# Patient Record
Sex: FEMALE | Race: WHITE | URBAN - METROPOLITAN AREA
[De-identification: names, ages, dates, MRNs, and addresses within clinical notes are randomized per-mention and may not be internally consistent; named-entity substitution may affect disease eponyms.]

---

## 2017-11-18 ENCOUNTER — OFFICE VISIT (OUTPATIENT)
Dept: NEUROLOGY | Facility: CLINIC | Age: 42
End: 2017-11-18

## 2017-11-18 VITALS
BODY MASS INDEX: 46.52 KG/M2 | HEART RATE: 86 BPM | HEIGHT: 64 IN | SYSTOLIC BLOOD PRESSURE: 141 MMHG | DIASTOLIC BLOOD PRESSURE: 85 MMHG | OXYGEN SATURATION: 96 % | RESPIRATION RATE: 20 BRPM | WEIGHT: 272.5 LBS

## 2017-11-18 DIAGNOSIS — R41.3 MEMORY LOSS: ICD-10-CM

## 2017-11-18 DIAGNOSIS — R26.9 ABNORMALITY OF GAIT: ICD-10-CM

## 2017-11-18 DIAGNOSIS — R26.9 ABNORMALITY OF GAIT: Primary | ICD-10-CM

## 2017-11-18 DIAGNOSIS — G93.40 ENCEPHALOPATHY: ICD-10-CM

## 2017-11-18 DIAGNOSIS — D69.6 PLATELETS DECREASED (H): ICD-10-CM

## 2017-11-18 LAB
BASOPHILS # BLD AUTO: 0 10E9/L (ref 0–0.2)
BASOPHILS NFR BLD AUTO: 0.4 %
DIFFERENTIAL METHOD BLD: NORMAL
EOSINOPHIL # BLD AUTO: 0.2 10E9/L (ref 0–0.7)
EOSINOPHIL NFR BLD AUTO: 2.2 %
ERYTHROCYTE [DISTWIDTH] IN BLOOD BY AUTOMATED COUNT: 12.8 % (ref 10–15)
HCT VFR BLD AUTO: 42.5 % (ref 35–47)
HGB BLD-MCNC: 13.6 G/DL (ref 11.7–15.7)
IMM GRANULOCYTES # BLD: 0 10E9/L (ref 0–0.4)
IMM GRANULOCYTES NFR BLD: 0.3 %
LYMPHOCYTES # BLD AUTO: 1.7 10E9/L (ref 0.8–5.3)
LYMPHOCYTES NFR BLD AUTO: 24.6 %
MCH RBC QN AUTO: 29.6 PG (ref 26.5–33)
MCHC RBC AUTO-ENTMCNC: 32 G/DL (ref 31.5–36.5)
MCV RBC AUTO: 92 FL (ref 78–100)
MONOCYTES # BLD AUTO: 0.5 10E9/L (ref 0–1.3)
MONOCYTES NFR BLD AUTO: 7.5 %
NEUTROPHILS # BLD AUTO: 4.4 10E9/L (ref 1.6–8.3)
NEUTROPHILS NFR BLD AUTO: 65 %
NRBC # BLD AUTO: 0 10*3/UL
NRBC BLD AUTO-RTO: 0 /100
PLATELET # BLD AUTO: 171 10E9/L (ref 150–450)
RBC # BLD AUTO: 4.6 10E12/L (ref 3.8–5.2)
WBC # BLD AUTO: 6.8 10E9/L (ref 4–11)

## 2017-11-18 ASSESSMENT — ENCOUNTER SYMPTOMS
FEVER: 0
INCREASED ENERGY: 1
FATIGUE: 1
WEAKNESS: 0
ARTHRALGIAS: 1
DIZZINESS: 1
NECK PAIN: 0
POOR WOUND HEALING: 0
CHILLS: 0
WEIGHT LOSS: 0
INSOMNIA: 1
HALLUCINATIONS: 0
PANIC: 0
WEIGHT GAIN: 0
NIGHT SWEATS: 1
MEMORY LOSS: 1
MUSCLE CRAMPS: 0
SEIZURES: 0
NERVOUS/ANXIOUS: 0
PARALYSIS: 0
DEPRESSION: 0
DECREASED APPETITE: 0
NUMBNESS: 1
STIFFNESS: 1
DECREASED CONCENTRATION: 1
POLYPHAGIA: 0
JOINT SWELLING: 0
DISTURBANCES IN COORDINATION: 1
NAIL CHANGES: 0
TINGLING: 0
SPEECH CHANGE: 0
BACK PAIN: 0
DECREASED LIBIDO: 0
MYALGIAS: 1
HOT FLASHES: 1
SKIN CHANGES: 0
POLYDIPSIA: 1
TREMORS: 0
ALTERED TEMPERATURE REGULATION: 1
LOSS OF CONSCIOUSNESS: 0
HEADACHES: 0
MUSCLE WEAKNESS: 0

## 2017-11-18 ASSESSMENT — PAIN SCALES - GENERAL: PAINLEVEL: SEVERE PAIN (7)

## 2017-11-18 NOTE — MR AVS SNAPSHOT
After Visit Summary   11/18/2017    Vero Almodovar    MRN: [id number]           Patient Information     Date Of Birth          1975        Visit Information        Provider Department      11/18/2017 11:40 AM Rosalio Lares MD [facility]        Today's Diagnoses     Abnormality of gait    -  1    Encephalopathy        Platelets decreased (H)        Memory loss           Follow-ups after your visit        Additional Services     Psychometric Testing (external facility)       Please be aware that coverage of these services is subject to the terms and limitations of your health insurance plan.  Call member services at your health plan with any benefit or coverage questions.  G[facility]is Hickey [facility] ;compare to earlier    Please bring the following to your appointment:  Any x-rays, CTs or MRIs which have been performed.  Contact the facility where they were done to arrange for  prior to your scheduled appointment.    List of current medications   This referral request   Any documents/labs given to you for this referral                  Follow-up notes from your care team     Return in about 2 months (around 1/26/2018).      Your next 10 appointments already scheduled     Nov 18, 2017  1:00 PM CST   LAB with [facility] ([facility])    [address] [place] [id number] [phone]           Please do not eat 10-12 hours before your appointment if you are coming in fasting for labs on lipids, cholesterol, or glucose (sugar). This does not apply to pregnant women. Water, hot tea and black coffee (with nothing added) are okay. Do not drink other fluids, diet soda or chew gum.            Feb 16, 2018 10:00 AM CST   (Arrive by 9:45 AM)   Return Visit with Vinh Bradshaw MD   [facility] ([facility])    [address] [place] [id number]  "  [phone]              Future tests that were ordered for you today     Open Future Orders        Priority Expected Expires Ordered    CBC with platelets differential Routine  11/18/2018 11/18/2017    Vitamin B1 whole blood Routine  11/18/2018 11/18/2017            Who to contact     Please call your clinic at [phone] to:    Ask questions about your health    Make or cancel appointments    Discuss your medicines    Learn about your test results    Speak to your doctor   If you have compliments or concerns about an experience at your clinic, or if you wish to file a complaint, please contact [facility] Patient Relations at [phone] or email us at [email or web address]         Additional Information About Your Visit        [employer] Information     [employer] gives you secure access to your electronic health record. If you see a primary care provider, you can also send messages to your care team and make appointments. If you have questions, please call your primary care clinic.  If you do not have a primary care provider, please call [phone] and they will assist you.      [employer] is an electronic gateway that provides easy, online access to your medical records. With [employer], you can request a clinic appointment, read your test results, renew a prescription or communicate with your care team.     To access your existing account, please contact your [facility] or call [phone] for assistance.        Care EveryWhere ID     This is your Care EveryWhere ID. This could be used by other organizations to access your [facility] medical records  [id number]        Your Vitals Were     Pulse Respirations Height Pulse Oximetry BMI (Body Mass Index)       86 20 1.626 m (5' 4\") 96% 46.77 kg/m2        Blood Pressure from Last 3 Encounters:   11/18/17 141/85   02/08/16 138/80   08/31/15 122/80    Weight from Last 3 Encounters:   11/18/17 123.6 kg " (272 lb 8 oz)              We Performed the Following     Psychometric Testing (external facility)        Primary Care Provider Office Phone # Fax #    Deepali Osei PA-C [phone]       [place] [address]        Equal Access to Services     BERTIN BLUM : Hadii aad ku hadedenilsono Soomaali, waaxda luqadaha, qaybta kaalmada adeegyada, waxay idiin hayenricon adejovanna cooper la'enricokathy smith. So [facility] [phone].    ATENCIÓN: Si habla español, tiene a gamboa disposición servicios gratuitos de asistencia lingüística. Llame al [phone].    We comply with applicable federal civil rights laws and [place] laws. We do not discriminate on the basis of race, color, national origin, age, disability, sex, sexual orientation, or gender identity.            Thank you!     Thank you for choosing [facility]  for your care. Our goal is always to provide you with excellent care. Hearing back from our patients is one way we can continue to improve our services. Please take a few minutes to complete the written survey that you may receive in the mail after your visit with us. Thank you!             Your Updated Medication List - Protect others around you: Learn how to safely use, store and throw away your medicines at www.disposemymeds.org.          This list is accurate as of: 11/18/17 12:41 PM.  Always use your most recent med list.                   Brand Name Dispense Instructions for use Diagnosis    ALLEGRA PO      Take 180 mg by mouth daily        COLACE PO      Take 100 mg by mouth 2 times daily        FOLIC ACID PO      Take 1 mg by mouth daily        IMODIUM OR      Take by mouth as needed        MOTRIN PO      Take by mouth as needed        MULTIVITAMIN PO      Take by mouth daily        TYLENOL PO      Take 650 mg by mouth as needed for mild pain or fever        VITAMIN B-1 PO      Take 100 mg by mouth daily        ZOFRAN 4 MG tablet   Generic drug:  ondansetron      Take by  mouth every 8 hours as needed for nausea

## 2017-11-18 NOTE — LETTER
2017      Deepali Osei PA-C   [facility]    [address] [place] [address]      RE: Vero Almodovar   MRN: [id number]   : 1975      Dear Dr. Osei:      Your patient, Vero Almodovar, requested neurologic followup today on 2017.  Her chief complaint is that of imbalance, memory loss, and a desire that she go on disability.      The patient has now contacted a .  She has seen Dr. Bradshaw, my partner, a number of times.  I do have his notes and I reviewed those with the patient and also with her mother, Phyllis.  The patient is now 42 years old.  She developed what sounds like subacute or acute imbalance and memory disturbance when she was 40.  She was seen at [facility] when she was brought by ambulance.  It was felt it was perhaps a combination of alcohol intoxication and nutritional deficiencies causing her problem.  She had a low thiamine level since at least 2014.  She did have a normal B12 level then.  The patient improved when she was admitted to [facility].  Her alcohol level when she was admitted was 0.  She did tell me that she had used alcohol, but very sparingly and she did not feel that she was using alcohol at that time.  She said her problem had to do with poor food intake.  She was working at [employer] and she said her hours were such that it was hard for her to eat meals in any kind of regular manner.  The patient did tell me that her balance has improved since that time, but it has never fully recovered.  She said when she gets up she has to hold on to objects.  She has not suffered any falls.  She does note she leans.  This comes when she is upright and walking.  She also notes that her balance is not as good if she attempts to bend over.  The patient was forthright with me.  She has not been taking her thiamin nor her potassium as well as folic acid lately as she has lapsed done refills of her prescriptions.  She did tell  me though she is taking a multivitamin.  She said she would like to work, but she is between employment now because she does not have a permanent residence.  She has been living between her sister's and mother's homes.  The patient is single and she thought she was menopausal.  She had a number of tests done on 10/05/2017.  The patient at that time did have a normal LH, testosterone, estradiol, and prolactin level as well as metabolic profile.  Her TSH was 1.31.  Since that time, though, she has started to bleed and she said she had heavy spotting and clots.  This may have improved just in the last day or 2.  The patient did review with me her other labs and on 09/21/2017 she had normal liver function tests.  She had negative serology on 08/02/2015.  She had on 12/29/2016 a hemoglobin of 16.3 which would be elevated and her platelets were 109,000.  She had had earlier a platelet count just a short time before that which was also low.  I could not find that she has had a followup level.  She has not had any skin bruising but did have this heavy period after a number of months of not having periods.  She is celibate.  The patient said she now has a good diet.  She has not had any headache, visual disturbance, focal weakness, paresthesias nor incontinence.  She denied true vertigo.  She did not do well in school and was in special ed and her mother said she had ADHD.  She was able to graduate.  The patient did have psychometric testing that Dr. Bradshaw reviewed in 02/08/2016 and I believe this was probably done through [facility] with Dr. Génesis Ndiaye.  The patient seems to remember that name and going to the [facility] to have the test.  She performed in a borderline impaired range of overall intellectual ability.  This was consistent with her background and educational history.  The patient's records do indicate that this did not appear to be due to inattention, but she did show deficits in learning and  retention of information.  The patient also did have an MRI scan of her head then that showed atrophy.  The patient did tell me she is able to shop and manage her own bills.      Neurologic examination revealed a pleasant woman.  She was able to draw a clock correctly.  She said she could not recall 3/3 objects that she was able to register.  She could tell me 2 states that touch [place] but not [place].  She knew Hardik was the president and she knew he had recently been in [place].  Otherwise, she actually walked quite well for me.  She was able to do tandem with concentration.  She had negative Romberg and negative pull test.  She was areflexic.  She had normal cortical sensory testing and normal light touch.  The patient denied any sensory disturbance.  She had no dysmetria.  Cranial nerve examination was unremarkable with good extraocular movements.  She had full visual fields to confrontation.  The patient had fluent speech.  She did not have frontal lobe release signs.  She did not have cervical bruits nor did she have any abnormality of cardiac auscultation, including gallops or murmurs.      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      The patient feels that she really has not recovered fully, but she is now between residents and is going to look for work when she establishes herself.  I have encouraged her that she fill her prescriptions for the nutrition supplements that have been recommended.  She did tell me she does not use alcohol to any extent and has not used any illicit drugs.  She did have low platelet count documented on 12/29/2016 and elevated hemoglobin.  This has not been repeated that I could find through the [employer] website.  I am going to have this done today along with a thiamine level.  I have asked that she have followup psychometrics with Dr. Génesis Ndiaye at [facility] and then have followup with Dr. Bradshaw, her regular  neurologist, in December or January.      Thank you for allowing me to see this patient.      Sincerely yours,      Rosalio Lares MD       I spent 40 minutes with the patient today.  Over 50% of the time this involved counseling and coordination of care.      D: 2017 14:09   T: 2017 17:55   MT: ASHISH    Name:     THOR CARRANZA   MRN:      [id number]        Account:      [id number]   :      1975           Service Date: 2017    Document: [id number]

## 2017-11-19 NOTE — PROGRESS NOTES
2017      Deepali Osei PA-C   [facility]    [address] [place] [address]      RE: Vero Almodovar   MRN: [id number]   : 1975      Dear Dr. Osei:      Your patient, Vero Almodovar, requested neurologic followup today on 2017.  Her chief complaint is that of imbalance, memory loss, and a desire that she go on disability.      The patient has now contacted a .  She has seen Dr. Bradshaw, my partner, a number of times.  I do have his notes and I reviewed those with the patient and also with her mother, Phyllis.  The patient is now 42 years old.  She developed what sounds like subacute or acute imbalance and memory disturbance when she was 40.  She was seen at [facility] when she was brought by ambulance.  It was felt it was perhaps a combination of alcohol intoxication and nutritional deficiencies causing her problem.  She had a low thiamine level since at least 2014.  She did have a normal B12 level then.  The patient improved when she was admitted to [facility].  Her alcohol level when she was admitted was 0.  She did tell me that she had used alcohol, but very sparingly and she did not feel that she was using alcohol at that time.  She said her problem had to do with poor food intake.  She was working at [employer] and she said her hours were such that it was hard for her to eat meals in any kind of regular manner.  The patient did tell me that her balance has improved since that time, but it has never fully recovered.  She said when she gets up she has to hold on to objects.  She has not suffered any falls.  She does note she leans.  This comes when she is upright and walking.  She also notes that her balance is not as good if she attempts to bend over.  The patient was forthright with me.  She has not been taking her thiamin nor her potassium as well as folic acid lately as she has lapsed done refills of her prescriptions.  She did tell  me though she is taking a multivitamin.  She said she would like to work, but she is between employment now because she does not have a permanent residence.  She has been living between her sister's and mother's homes.  The patient is single and she thought she was menopausal.  She had a number of tests done on 10/05/2017.  The patient at that time did have a normal LH, testosterone, estradiol, and prolactin level as well as metabolic profile.  Her TSH was 1.31.  Since that time, though, she has started to bleed and she said she had heavy spotting and clots.  This may have improved just in the last day or 2.  The patient did review with me her other labs and on 09/21/2017 she had normal liver function tests.  She had negative serology on 08/02/2015.  She had on 12/29/2016 a hemoglobin of 16.3 which would be elevated and her platelets were 109,000.  She had had earlier a platelet count just a short time before that which was also low.  I could not find that she has had a followup level.  She has not had any skin bruising but did have this heavy period after a number of months of not having periods.  She is celibate.  The patient said she now has a good diet.  She has not had any headache, visual disturbance, focal weakness, paresthesias nor incontinence.  She denied true vertigo.  She did not do well in school and was in special ed and her mother said she had ADHD.  She was able to graduate.  The patient did have psychometric testing that Dr. Bradshaw reviewed in 02/08/2016 and I believe this was probably done through [facility] with Dr. Génesis Ndiaye.  The patient seems to remember that name and going to the [facility] to have the test.  She performed in a borderline impaired range of overall intellectual ability.  This was consistent with her background and educational history.  The patient's records do indicate that this did not appear to be due to inattention, but she did show deficits in learning and  retention of information.  The patient also did have an MRI scan of her head then that showed atrophy.  The patient did tell me she is able to shop and manage her own bills.      Neurologic examination revealed a pleasant woman.  She was able to draw a clock correctly.  She said she could not recall 3/3 objects that she was able to register.  She could tell me 2 states that touch [place] but not [place].  She knew Hardik was the president and she knew he had recently been in [place].  Otherwise, she actually walked quite well for me.  She was able to do tandem with concentration.  She had negative Romberg and negative pull test.  She was areflexic.  She had normal cortical sensory testing and normal light touch.  The patient denied any sensory disturbance.  She had no dysmetria.  Cranial nerve examination was unremarkable with good extraocular movements.  She had full visual fields to confrontation.  The patient had fluent speech.  She did not have frontal lobe release signs.  She did not have cervical bruits nor did she have any abnormality of cardiac auscultation, including gallops or murmurs.      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      The patient feels that she really has not recovered fully, but she is now between residents and is going to look for work when she establishes herself.  I have encouraged her that she fill her prescriptions for the nutrition supplements that have been recommended.  She did tell me she does not use alcohol to any extent and has not used any illicit drugs.  She did have low platelet count documented on 12/29/2016 and elevated hemoglobin.  This has not been repeated that I could find through the [employer] website.  I am going to have this done today along with a thiamine level.  I have asked that she have followup psychometrics with Dr. Génesis Ndiaye at [facility] and then have followup with Dr. Bradshaw, her regular  neurologist, in December or January.      Thank you for allowing me to see this patient.      Sincerely yours,      Neelam Lares MD       I spent 40 minutes with the patient today.  Over 50% of the time this involved counseling and coordination of care.         NEELAM LARES MD             D: 2017 14:09   T: 2017 17:55   MT: ASHISH      Name:     THOR CARRANZA   MRN:      [id number]        Account:      [id number]   :      1975           Service Date: 2017      Document: [id number]

## 2017-11-23 LAB — VIT B1 BLD-MCNC: 80 NMOL/L (ref 70–180)

## 2017-11-29 ENCOUNTER — TELEPHONE (OUTPATIENT)
Dept: NEUROLOGY | Facility: CLINIC | Age: 42
End: 2017-11-29

## 2017-11-29 NOTE — TELEPHONE ENCOUNTER
----- Message from Rosalio Lares MD sent at 11/29/2017  9:46 AM CST -----  Tell her blood tests are normal

## 2017-12-31 ENCOUNTER — HEALTH MAINTENANCE LETTER (OUTPATIENT)
Age: 42
End: 2017-12-31

## 2018-01-18 ENCOUNTER — CARE COORDINATION (OUTPATIENT)
Dept: NEUROLOGY | Facility: CLINIC | Age: 43
End: 2018-01-18

## 2018-01-18 NOTE — PROGRESS NOTES
----- Message -----      From: Becca Blair      Sent: 1/18/2018  10:20 AM        To: Ivan Vogel, RN, Ainsley Lockwood RN, *   Subject: Neuro-psych referral update and appointment needed       Per call from patient and her  Aikko in re: to the referral for (Neuropsych) from Dr. Lares.  They would like to request this referral to be done internally instead and be seen by one of our Neuropsych providers.  Please call patient's mom Phyllis Almodovar at [phone] to arrange scheduling as this evaluation needs to be done ASAP for her disability.  The patient's hearing is tomorrow so if they can at least show that the appointment is scheduled, then they can supplement the results later if needed.     Thank You,   Becca        1/18/18: message sent to Raquel Flood in neuropsych to contact patient to schedule. Left voicemail message for patient's mom, Phyllis, letting her know this. Left my contact info incase she has any questions/concerns.

## 2018-02-16 ENCOUNTER — OFFICE VISIT (OUTPATIENT)
Dept: NEUROLOGY | Facility: CLINIC | Age: 43
End: 2018-02-16
Payer: COMMERCIAL

## 2018-02-16 VITALS
HEART RATE: 64 BPM | WEIGHT: 263.3 LBS | BODY MASS INDEX: 44.95 KG/M2 | DIASTOLIC BLOOD PRESSURE: 83 MMHG | SYSTOLIC BLOOD PRESSURE: 132 MMHG | HEIGHT: 64 IN

## 2018-02-16 DIAGNOSIS — R41.3 MEMORY LOSS: Primary | ICD-10-CM

## 2018-02-16 ASSESSMENT — PAIN SCALES - GENERAL: PAINLEVEL: NO PAIN (0)

## 2018-02-16 NOTE — PROGRESS NOTES
Service Date: 2018      HISTORY OF PRESENT ILLNESS:  This patient is seen in followup with encephalopathy, memory impairment and gait imbalance.  She is here with her mother, Phyllis.  I last saw the patient 2 years ago and she did see Dr. Lares in 2017.  The main issue now is that she is filing for disability.  There are questions about her neuropsychological status.  Neuropsych testing has not been done.  The patient reports no new symptoms.  She says that she has to write things down because her memory is not working adequately.  She is able to walk, although her balance has been affected and it takes her longer to do things than in the past.      PHYSICAL EXAMINATION:  Her blood pressure is 132/83.  Visual fields are full to confrontation.  Eye movements are complete and conjugate without nystagmus.  Finger-nose-finger and heel-knee-shin are unremarkable.  She easily gets out of a chair but then stands in place for a few moments before starting to walk.  She can get up on her heels and toes, but it requires a great deal of effort.  Her tandem gait is somewhat clumsy but can be done.  She scored a 21/30 on MoCA.  She had problems with visual spatial and also her short-term memory was poor.      IMPRESSION:  History of encephalopathy with cognitive impairment and gait imbalance.      DISCUSSION:  This patient is seen in followup with the above issues.  There has been no major change in her neurologic status.  I am going to schedule her for neuropsychometric testing.  She did have a thiamine level checked in November by Dr. Lares and it was in the normal range.  I will see her in followup after the neuropsych testing.         MARKUS CASTANEDA MD             D: 2018   T: 2018   MT: abhijeet      Name:     THOR CARRANZA   MRN:      [id number]        Account:      [id number]   :      1975           Service Date: 2018      Document: [id number]

## 2018-02-16 NOTE — MR AVS SNAPSHOT
After Visit Summary   2/16/2018    Vero Almodovar    MRN: [id number]           Patient Information     Date Of Birth          1975        Visit Information        Provider Department      2/16/2018 10:00 AM Vinh Bradshaw MD [facility] Neurology        Today's Diagnoses     Memory loss    -  1       Follow-ups after your visit        Follow-up notes from your care team     Return in about 6 weeks (around 3/30/2018).      Your next 10 appointments already scheduled     Apr 06, 2018 11:00 AM CDT   (Arrive by 10:45 AM)   Return Visit with Vinh Bradshaw MD   [facility]rology ([facility])    [address] [address] [phone]              Future tests that were ordered for you today     Open Future Orders        Priority Expected Expires Ordered    Neuropsychological testing Routine  2/16/2019 2/16/2018            Who to contact     Please call your clinic at [phone] to:    Ask questions about your health    Make or cancel appointments    Discuss your medicines    Learn about your test results    Speak to your doctor            Additional Information About Your Visit        Venuemobhart Information     Glyde gives you secure access to your electronic health record. If you see a primary care provider, you can also send messages to your care team and make appointments. If you have questions, please call your primary care clinic.  If you do not have a primary care provider, please call [phone] and they will assist you.      Glyde is an electronic gateway that provides easy, online access to your medical records. With Glyde, you can request a clinic appointment, read your test results, renew a prescription or communicate with your care team.     To access your existing account, please contact your [facility] or call [phone] for assistance.        Care EveryWhere ID     This is your  "Care EveryWhere ID. This could be used by other organizations to access your [place] medical records  [id number]        Your Vitals Were     Pulse Height BMI (Body Mass Index)             64 1.626 m (5' 4\") 45.2 kg/m2          Blood Pressure from Last 3 Encounters:   02/16/18 132/83   11/18/17 141/85   02/08/16 138/80    Weight from Last 3 Encounters:   02/16/18 119.4 kg (263 lb 4.8 oz)   11/18/17 123.6 kg (272 lb 8 oz)               Primary Care Provider Office Phone # Fax #    Deepali Osei PA-C [phone]       [facility] [address] [place] [address]        Equal Access to Services     BERTIN BLUM : Hadii aad ku hadasho Soomaali, waaxda luqadaha, qaybta kaalmada adeegyada, angeline kaminski . So [facility] [phone].    ATENCIÓN: Si habla español, tiene a gamboa disposición servicios gratuitos de asistencia lingüística. [facility] [phone].    We comply with applicable federal civil rights laws and [place] laws. We do not discriminate on the basis of race, color, national origin, age, disability, sex, sexual orientation, or gender identity.            Thank you!     Thank you for choosing [facility] NEUROLOGY  for your care. Our goal is always to provide you with excellent care. Hearing back from our patients is one way we can continue to improve our services. Please take a few minutes to complete the written survey that you may receive in the mail after your visit with us. Thank you!             Your Updated Medication List - Protect others around you: Learn how to safely use, store and throw away your medicines at www.disposemymeds.org.          This list is accurate as of 2/16/18 10:32 AM.  Always use your most recent med list.                   Brand Name Dispense Instructions for use Diagnosis    ALLEGRA PO      Take 180 mg by mouth daily        COLACE PO      Take 100 mg by mouth 2 times daily        FOLIC ACID PO      Take 1 mg by mouth daily        " IMODIUM OR      Take by mouth as needed        MOTRIN PO      Take by mouth as needed        MULTIVITAMIN PO      Take by mouth daily        TYLENOL PO      Take 650 mg by mouth as needed for mild pain or fever        VITAMIN B-1 PO      Take 100 mg by mouth daily        ZOFRAN 4 MG tablet   Generic drug:  ondansetron      Take by mouth every 8 hours as needed for nausea

## 2018-02-16 NOTE — LETTER
2/16/2018       RE: Vero Almodovar  [address]  [place] [address]     Dear Colleague,    Thank you for referring your patient, Vero Almodovar, to the [facility] NEUROLOGY at [facility]. Please see a copy of my visit note below.    Service Date: 02/16/2018      HISTORY OF PRESENT ILLNESS:  This patient is seen in followup with encephalopathy, memory impairment and gait imbalance.  She is here with her mother, Phyllis.  I last saw the patient 2 years ago and she did see Dr. Lares in 11/2017.  The main issue now is that she is filing for disability.  There are questions about her neuropsychological status.  Neuropsych testing has not been done.  The patient reports no new symptoms.  She says that she has to write things down because her memory is not working adequately.  She is able to walk, although her balance has been affected and it takes her longer to do things than in the past.      PHYSICAL EXAMINATION:  Her blood pressure is 132/83.  Visual fields are full to confrontation.  Eye movements are complete and conjugate without nystagmus.  Finger-nose-finger and heel-knee-shin are unremarkable.  She easily gets out of a chair but then stands in place for a few moments before starting to walk.  She can get up on her heels and toes, but it requires a great deal of effort.  Her tandem gait is somewhat clumsy but can be done.  She scored a 21/30 on MoCA.  She had problems with visual spatial and also her short-term memory was poor.      IMPRESSION:  History of encephalopathy with cognitive impairment and gait imbalance.      DISCUSSION:  This patient is seen in followup with the above issues.  There has been no major change in her neurologic status.  I am going to schedule her for neuropsychometric testing.  She did have a thiamine level checked in November by Dr. Lares and it was in the normal range.  I will see her in followup after the neuropsych testing.         MARKUS HERNANDEZ  MD TONYA             D: 2018   T: 2018   MT: [email or web address]      Name:     THOR CARRANZA   MRN:      [id number]        Account:      [id number]   :      1975           Service Date: 2018      Document: [id number]       Again, thank you for allowing me to participate in the care of your patient.      Sincerely,    Vinh Bradshaw MD

## 2018-04-06 ENCOUNTER — OFFICE VISIT (OUTPATIENT)
Dept: NEUROLOGY | Facility: CLINIC | Age: 43
End: 2018-04-06
Payer: COMMERCIAL

## 2018-04-06 VITALS
WEIGHT: 274 LBS | SYSTOLIC BLOOD PRESSURE: 135 MMHG | HEIGHT: 64 IN | BODY MASS INDEX: 46.78 KG/M2 | HEART RATE: 88 BPM | DIASTOLIC BLOOD PRESSURE: 92 MMHG

## 2018-04-06 DIAGNOSIS — R41.3 MEMORY LOSS: Primary | ICD-10-CM

## 2018-04-06 ASSESSMENT — ENCOUNTER SYMPTOMS
DIZZINESS: 1
DECREASED LIBIDO: 0
DOUBLE VISION: 0
SPEECH CHANGE: 0
WEIGHT GAIN: 0
BACK PAIN: 1
WEAKNESS: 0
INCREASED ENERGY: 1
SMELL DISTURBANCE: 1
FEVER: 0
NECK MASS: 0
NECK PAIN: 0
SORE THROAT: 0
DISTURBANCES IN COORDINATION: 1
POLYDIPSIA: 0
EYE WATERING: 0
NUMBNESS: 0
TROUBLE SWALLOWING: 0
POLYPHAGIA: 0
SINUS PAIN: 0
MUSCLE CRAMPS: 1
HALLUCINATIONS: 0
HOARSE VOICE: 0
STIFFNESS: 1
ARTHRALGIAS: 1
WEIGHT LOSS: 0
TINGLING: 0
FATIGUE: 1
PARALYSIS: 0
SINUS CONGESTION: 0
JOINT SWELLING: 0
ALTERED TEMPERATURE REGULATION: 1
MUSCLE WEAKNESS: 0
DECREASED APPETITE: 0
TREMORS: 0
MEMORY LOSS: 1
HOT FLASHES: 1
CHILLS: 1
LOSS OF CONSCIOUSNESS: 0
MYALGIAS: 1
TASTE DISTURBANCE: 0
HEADACHES: 0
EYE IRRITATION: 1
EYE PAIN: 0
SEIZURES: 0
EYE REDNESS: 0
NIGHT SWEATS: 1

## 2018-04-06 ASSESSMENT — PAIN SCALES - GENERAL: PAINLEVEL: NO PAIN (0)

## 2018-04-06 NOTE — LETTER
"2018       RE: Thor Almodovar  [address]  [place] [address]     Dear Colleague,    Thank you for referring your patient, Thor Almodovar, to the [facility]. Please see a copy of my visit note below.    Service Date: 2018      INTERVAL HISTORY:  This patient is seen in followup with issues of cognitive impairment and encephalopathy.  She is here with her mother, Stefany.  I saw the patient 6 weeks ago.  At that time I had ordered neuropsychometric testing.  However, the testing was never performed.  She reports no new complaints.  She continues to feel somewhat off balance, and that also was an issue when I saw her in February.  She does report that 30 days ago or so, she laid down and turned her head and her \"head shifted.\"  Her eyes were bouncing back and forth.  That has not recurred.  Her mother reports that the patient's memory is slow.  There is repetitive questioning.      PHYSICAL EXAMINATION:   GENERAL:   The patient is cooperative and in no distress.     VITAL SIGNS:   Her blood pressure is 135/92.     NEUROLOGIC:   There is nothing to add on neurologic exam.      ASSESSMENT:   1.  Concerns about cognitive impairment with chronic encephalopathy and gait disorder.      DISCUSSION:  The patient is seen with the above issues.  I am reordering neuropsychometric referral.  This will have to be compared to her neuropsych testing from 2016.  I will see her in followup in about 6 weeks.        D: 2018   T: 2018   MT: ASHISH      Name:     THOR ALMODOVAR   MRN:      [id number]        Account:      [id number]   :      1975           Service Date: 2018      Document: [id number]       Again, thank you for allowing me to participate in the care of your patient.      Sincerely,    Vinh Bradshaw MD      "

## 2018-04-06 NOTE — MR AVS SNAPSHOT
After Visit Summary   4/6/2018    Vero Almodovar    MRN: [id number]           Patient Information     Date Of Birth          1975        Visit Information        Provider Department      4/6/2018 11:00 AM Vinh Bradshaw MD [facility] Neurology        Today's Diagnoses     Memory loss    -  1       Follow-ups after your visit        Follow-up notes from your care team     Return in about 6 weeks (around 5/18/2018).      Your next 10 appointments already scheduled     Apr 06, 2018 11:00 AM CDT   (Arrive by 10:45 AM)   Return Visit with Vinh Bradshaw MD   [facility] Neurology ([facility])    [address] [id number] [phone]            May 31, 2018 12:30 PM CDT   (Arrive by 12:15 PM)   Return Visit with Vinh Bradshaw MD   [facility] Neurology ([facility])    [address] [place] [id number] [phone]              Future tests that were ordered for you today     Open Future Orders        Priority Expected Expires Ordered    Neuropsychological testing Routine  4/6/2019 4/6/2018            Who to contact     Please call your clinic at [phone] to:    Ask questions about your health    Make or cancel appointments    Discuss your medicines    Learn about your test results    Speak to your doctor            Additional Information About Your Visit        Tred Information     Tred gives you secure access to your electronic health record. If you see a primary care provider, you can also send messages to your care team and make appointments. If you have questions, please call your primary care clinic.  If you do not have a primary care provider, please call [phone] and they will assist you.      Tred is an electronic gateway that provides easy, online access to your medical records. With Tred, you can request a clinic appointment, read your test results,  "renew a prescription or communicate with your care team.     To access your existing account, please contact your [facility] or call [phone] for assistance.        Care EveryWhere ID     This is your Care EveryWhere ID. This could be used by other organizations to access your [place] medical records  [id number]        Your Vitals Were     Pulse Height BMI (Body Mass Index)             88 1.626 m (5' 4\") 47.03 kg/m2          Blood Pressure from Last 3 Encounters:   04/06/18 (!) 135/92   02/16/18 132/83   11/18/17 141/85    Weight from Last 3 Encounters:   04/06/18 124.3 kg (274 lb)   02/16/18 119.4 kg (263 lb 4.8 oz)   11/18/17 123.6 kg (272 lb 8 oz)                 Today's Medication Changes          These changes are accurate as of 4/6/18 10:58 AM.  If you have any questions, ask your nurse or doctor.               Stop taking these medicines if you haven't already. Please contact your care team if you have questions.     TYLENOL PO   Stopped by:  Vinh Bradshaw MD                    Primary Care Provider Office Phone # Fax #    Deepali Osei PA-C [phone]       [place] [address]        Equal Access to Services     BERTIN BLUM AH: Hadii aad ku hadasho Soomaali, waaxda luqadaha, qaybta kaalmada adeegyada, angeline smith. So [facility] [phone].    ATENCIÓN: Si habla español, tiene a gamboa disposición servicios gratuitos de asistencia lingüística. Llame al [phone].    We comply with applicable federal civil rights laws and [place] laws. We do not discriminate on the basis of race, color, national origin, age, disability, sex, sexual orientation, or gender identity.            Thank you!     Thank you for choosing [facility] NEUROLOGY  for your care. Our goal is always to provide you with excellent care. Hearing back from our patients is one way we can continue to improve our services. Please " take a few minutes to complete the written survey that you may receive in the mail after your visit with us. Thank you!             Your Updated Medication List - Protect others around you: Learn how to safely use, store and throw away your medicines at www.disposemymeds.org.          This list is accurate as of 4/6/18 10:58 AM.  Always use your most recent med list.                   Brand Name Dispense Instructions for use Diagnosis    ALLEGRA PO      Take 180 mg by mouth daily        COLACE PO      Take 100 mg by mouth 2 times daily        FOLIC ACID PO      Take 1 mg by mouth daily        IMODIUM OR      Take by mouth as needed        MOTRIN PO      Take 400 mg by mouth as needed        MULTIVITAMIN PO      Take by mouth daily        VITAMIN B-1 PO      Take 100 mg by mouth daily        ZOFRAN 4 MG tablet   Generic drug:  ondansetron      Take by mouth every 8 hours as needed for nausea

## 2018-04-06 NOTE — PROGRESS NOTES
"Service Date: 2018      INTERVAL HISTORY:  This patient is seen in followup with issues of cognitive impairment and encephalopathy.  She is here with her mother, Stefany.  I saw the patient 6 weeks ago.  At that time I had ordered neuropsychometric testing.  However, the testing was never performed.  She reports no new complaints.  She continues to feel somewhat off balance, and that also was an issue when I saw her in February.  She does report that 30 days ago or so, she laid down and turned her head and her \"head shifted.\"  Her eyes were bouncing back and forth.  That has not recurred.  Her mother reports that the patient's memory is slow.  There is repetitive questioning.      PHYSICAL EXAMINATION:   GENERAL:   The patient is cooperative and in no distress.     VITAL SIGNS:   Her blood pressure is 135/92.     NEUROLOGIC:   There is nothing to add on neurologic exam.      ASSESSMENT:   1.  Concerns about cognitive impairment with chronic encephalopathy and gait disorder.      DISCUSSION:  The patient is seen with the above issues.  I am reordering neuropsychometric referral.  This will have to be compared to her neuropsych testing from 2016.  I will see her in followup in about 6 weeks.      MD MARKUS Villela MD             D: 2018   T: 2018   MT: ASHISH      Name:     THOR CARRANZA   MRN:      [id number]        Account:      [id number]   :      1975           Service Date: 2018      Document: [id number]    "

## 2018-05-18 ENCOUNTER — OFFICE VISIT (OUTPATIENT)
Dept: NEUROPSYCHOLOGY | Facility: CLINIC | Age: 43
End: 2018-05-18
Payer: COMMERCIAL

## 2018-05-18 DIAGNOSIS — R41.3 MEMORY LOSS: Primary | ICD-10-CM

## 2018-05-18 DIAGNOSIS — F41.9 ANXIETY: ICD-10-CM

## 2018-05-18 NOTE — NURSING NOTE
The patient was seen for neuropsychological evaluation at the request of Dr. Vinh Bradshaw for the purpose of diagnostic clarification and treatment planning.  2 hours of face to face testing were provided by this writer.  Please see Dr. Luís Simpson's report for a full interpretation of the findings.

## 2018-05-18 NOTE — MR AVS SNAPSHOT
After Visit Summary   5/18/2018    Vero Almodovar    MRN: [id number]           Patient Information     Date Of Birth          1975        Visit Information        Provider Department      5/18/2018 12:30 PM Luís Simpson, PhD [facility] Neuropsychology        Today's Diagnoses     Memory loss    -  1    Anxiety           Follow-ups after your visit        Your next 10 appointments already scheduled     May 31, 2018 12:30 PM CDT   (Arrive by 12:15 PM)   Return Visit with Vinh Bradshaw MD   [place] [facility] ([facility])    [address] [place] [id number] [phone]              Who to contact     Please call your clinic at [phone] to:    Ask questions about your health    Make or cancel appointments    Discuss your medicines    Learn about your test results    Speak to your doctor            Additional Information About Your Visit        MyChart Information     Mclowd gives you secure access to your electronic health record. If you see a primary care provider, you can also send messages to your care team and make appointments. If you have questions, please call your primary care clinic.  If you do not have a primary care provider, please call [phone] and they will assist you.      Mclowd is an electronic gateway that provides easy, online access to your medical records. With Mclowd, you can request a clinic appointment, read your test results, renew a prescription or communicate with your care team.     To access your existing account, please contact your [facility] or call [phone] for assistance.        Care EveryWhere ID     This is your Care EveryWhere ID. This could be used by other organizations to access your [place] medical records  [id number]         Blood Pressure from Last 3 Encounters:   04/06/18 (!) 135/92   02/16/18 132/83   11/18/17 141/85    Weight from Last 3  Encounters:   04/06/18 124.3 kg (274 lb)   02/16/18 119.4 kg (263 lb 4.8 oz)   11/18/17 123.6 kg (272 lb 8 oz)              We Performed the Following     [id number] TESTING, PER HR/PSYCHOLOGIST     NEUROPSYCH TESTING BY [facility]        Primary Care Provider Office Phone # Fax #    Deepali Osei PA-C [phone]       [place] [address]        Equal Access to Services     BERTIN BLUM : Hadii aad ku hadasho Soomaali, waaxda luqadaha, qaybta kaalmada adeegyada, waxay idiin hayaan adeeg allisno kaminski . So [facility] [phone].    ATENCIÓN: Si habla español, tiene a gamboa disposición servicios gratuitos de asistencia lingüística. [facility] [phone].    We comply with applicable federal civil rights laws and [place] laws. We do not discriminate on the basis of race, color, national origin, age, disability, sex, sexual orientation, or gender identity.            Thank you!     Thank you for choosing [facility] NEUROPSYCHOLOGY  for your care. Our goal is always to provide you with excellent care. Hearing back from our patients is one way we can continue to improve our services. Please take a few minutes to complete the written survey that you may receive in the mail after your visit with us. Thank you!             Your Updated Medication List - Protect others around you: Learn how to safely use, store and throw away your medicines at www.disposemymeds.org.          This list is accurate as of 5/18/18 11:59 PM.  Always use your most recent med list.                   Brand Name Dispense Instructions for use Diagnosis    ALLEGRA PO      Take 180 mg by mouth daily        COLACE PO      Take 100 mg by mouth 2 times daily        FOLIC ACID PO      Take 1 mg by mouth daily        IMODIUM OR      Take by mouth as needed        MOTRIN PO      Take 400 mg by mouth as needed        MULTIVITAMIN PO      Take by mouth daily        VITAMIN B-1 PO      Take 100 mg by mouth daily         ZOFRAN 4 MG tablet   Generic drug:  ondansetron      Take by mouth every 8 hours as needed for nausea
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
## 2018-05-25 ENCOUNTER — TELEPHONE (OUTPATIENT)
Dept: NEUROPSYCHOLOGY | Facility: CLINIC | Age: 43
End: 2018-05-25

## 2018-05-25 NOTE — TELEPHONE ENCOUNTER
With the patient's permission, I spoke with her mother to provide feedback about her neuropsychology test results. All questions were answered to her satisfaction.    Luís Simpson, Ph.D., L.P., ABPP-CN    Department of Rehabilitation Medicine  Division of Adult Neuropsychology  [facility]

## 2018-05-31 ENCOUNTER — OFFICE VISIT (OUTPATIENT)
Dept: NEUROLOGY | Facility: CLINIC | Age: 43
End: 2018-05-31
Payer: COMMERCIAL

## 2018-05-31 VITALS
HEIGHT: 64 IN | BODY MASS INDEX: 48.49 KG/M2 | DIASTOLIC BLOOD PRESSURE: 100 MMHG | HEART RATE: 101 BPM | SYSTOLIC BLOOD PRESSURE: 185 MMHG | WEIGHT: 284 LBS

## 2018-05-31 DIAGNOSIS — G93.40 ENCEPHALOPATHY: Primary | ICD-10-CM

## 2018-05-31 DIAGNOSIS — R42 DIZZINESS: ICD-10-CM

## 2018-05-31 NOTE — LETTER
"5/31/2018     RE: Vero Almodovar  [address]  [place] [address]     Dear Colleague,    Thank you for referring your patient, Vero Almodovar, to the [facility] NEUROLOGY at [facility]. Please see a copy of my visit note below.    Service Date: 05/31/2018      INTERVAL HISTORY:  This patient is seen in followup with cognitive impairment.  She is here with her mother, Phyllis.  I last saw the patient about 6 weeks ago.  She did have neuropsychometric testing performed.  I have reviewed the results with her.  The test was compared to another evaluation from 2016.  There is no major difference or change between the 2 exams.  She demonstrates borderline range intellectual functioning.  She continues to have deficits in verbal and nonverbal memory.  Expressive language skills are a strength.  She has mild anxiety.  She reports that she was in special ed classes growing up.  She is still trying to get disability.      She does live in a 1-bedroom public housing residence.  It is handicap accessible with safety bars.  Her residence works out well for her.  She also complains of \"her eyes crossing.\"      PHYSICAL EXAMINATION:   GENERAL:  The patient is cooperative and in no distress.   VITAL SIGNS:  Her blood pressure is elevated at 185/100.     NEUROLOGIC:   Hallpike maneuver was attempted but the patient became extremely symptomatic with spinning.  She closed her eyes.  She had to sit up immediately.  She would not let me complete the exam.  Then she got a cramp in her stomach and had to stand up. Of note, I did have her walk down the morin and she goes 25 feet in 10 seconds.  She has a waddling sort of gait, but her balance actually seems quite good walking down the hallway.      ASSESSMENT:   1.  Chronic encephalopathy, with cognitive impairment.   2.  Probable benign positional vertigo.      DISCUSSION:  This patient is seen in followup with ongoing issues of cognitive impairment.  I " suspect this is related to her baseline borderline intelligence.  She also had an event that occurred in  that could have been Wernicke's encephalopathy, which could have contributed to her cognitive impairment.  She is trying to get disability.  The patient would be quite limited in her work capabilities.  Given her borderline intelligence and gait imbalance, there would not be many job opportunities for her.      In addition, she appears to have benign positional vertigo.  I am going to refer her to physical therapy to see if they might be able to perform an Epley maneuver.       I will see her in followup on an as needed basis.      This was a 25-minute appointment, more than half of which was spent in counseling and coordination of care.        Vinh Bradshaw MD      cc:   Deepali Osei PA-C   [facility]    [address] [address]       D: 2018   T: 2018   MT: ASHISH    Name:     THOR CARRANZA   MRN:      [id number]        Account:      [id number]   :      1975           Service Date: 2018    Document: [id number]

## 2018-05-31 NOTE — PROGRESS NOTES
__ Orientation            Time          __-1___        Place        ____2____        Personal Info.     ____4____    WAIS-IV   FSIQ____VCI_____PRI_____ WMI___PSI_       Raw  Age SS  __Similarities  __19___ __7___  __Vocabulary  _____  ______  __Information  ______          ______  __Comprehension _______ _______  __Block Design  __24___ __6__  __Matrix Reas.  _____  _____  __Visual Puzzles ______  ______  __Picture Comp. _______ _______  __Figure Weights _______ _______  __Digit Span  __ 17___ __5___  __Arithmetic  __ 8___               __5___  __L-N Sequencing _______ _______  __Symbol Search _____  _____  __Coding  __35___ ___4___  __Cancellation  _______ _______    __AVLT  Trial   1         2         3         4         5         B          6            _5_    _6_     _6_     _4_     _6_     _3_     _5_      Raw    zscore  Learning   _27___  -2.80  Short Delay   _5__    -2.08  30 min. delayed recall  _2__   -2.93  Recognition hits   _5___   -6.43  Recognition false positives _4___              -    __Dusty-Rhonda/Janae Complex Figure Test    Raw  T-score   %ile  Copy   __17_         -                  ?1  Imm. Recall __2.5_  __<20___ __<1__  30  Delay __1.5_  __<20___ __<1__  Recognition __19__   __39___ __14__  Time               _244                         __BVRT  (form C)  Copy E-10 rating ____/4     Raw    expected  Correct  ___2____ __6__  Incorrect ___14____ __6-7__    __WRAT-4   Reading SS = 75     %ile = 5    GE = 5.1    __COWAT   Raw__29___ Tscore__46___   __Semantic Fluency/Animals   Raw = 17  Tscore = 50  __BNT   Raw = 41/60 Tscore = 39  __Complex Ideational Material   Raw = 8/12 Tscore = 15    __Trail Making Test   A =   41         Errors = 0    Tscore = 41   B = DC @ 300         Errors = 2    Tscore = N/A  __Stroop                       Raw         Tsocre        Word = _61_      _27__        Color =  _51_      _31        C/W   =  _17_     _22__    __JoLO   Raw = 20 %ile = 9    __Grooved  Pegboard   RH = 87          Tscore = 40      Drops = 0   LH = 106           TScore = 37      Drops = 0    __BDI-II   Raw__8__ Interp.__Minimal___   __BAI     Raw__8_ Interp. __Mild___    __WMS-III   LM1 = 20  SS = 4   LM2 = 3  SS = 2   LM2R = 19 Zscore = -2.34

## 2018-05-31 NOTE — MR AVS SNAPSHOT
"              After Visit Summary   5/31/2018    Vero Almodovar    MRN: [id number]           Patient Information     Date Of Birth          1975        Visit Information        Provider Department      5/31/2018 12:30 PM Vinh Bradshaw MD [facility] Neurology        Today's Diagnoses     Encephalopathy    -  1    Dizziness           Follow-ups after your visit        Additional Services     PHYSICAL THERAPY REFERRAL       *This therapy referral will be filtered to a centralized scheduling office at [facility] and the patient will receive a call to schedule an appointment at a [facility] location most convenient for them. *     [facility] provides Physical Therapy evaluation and treatment and many specialty services across the [facility] system.  If requesting a specialty program, please choose from the list below.    If you have not heard from the scheduling office within 2 business days, please call [phone] for all locations, with the exception of [place], please call [phone] and [facility], please call [phone]  Treatment: Evaluation & Treatment  Special Instructions/Modalities: dizzy and balance  Special Programs: Balance/Vestibular    Please be aware that coverage of these services is subject to the terms and limitations of your health insurance plan.  Call member services at your health plan with any benefit or coverage questions.      **Note to Provider:  If you are referring outside of [facility] for the therapy appointment, please list the name of the location in the \"special instructions\" above, print the referral and give to the patient to schedule the appointment.                  Follow-up notes from your care team     Return if symptoms worsen or fail to improve.      Who to contact     Please call your clinic at [phone] to:    Ask questions about your health    Make or cancel appointments    Discuss your medicines    Learn about your test " "results    Speak to your doctor            Additional Information About Your Visit        [employer] Information     [employer] gives you secure access to your electronic health record. If you see a primary care provider, you can also send messages to your care team and make appointments. If you have questions, please call your primary care clinic.  If you do not have a primary care provider, please call [phone] and they will assist you.      [employer] is an electronic gateway that provides easy, online access to your medical records. With [employer], you can request a clinic appointment, read your test results, renew a prescription or communicate with your care team.     To access your existing account, please contact your [facility] or call [phone] for assistance.        Care EveryWhere ID     This is your Care EveryWhere ID. This could be used by other organizations to access your [place] medical records  [id number]        Your Vitals Were     Pulse Height BMI (Body Mass Index)             101 1.626 m (5' 4\") 48.75 kg/m2          Blood Pressure from Last 3 Encounters:   05/31/18 (!) 185/100   04/06/18 (!) 135/92   02/16/18 132/83    Weight from Last 3 Encounters:   05/31/18 128.8 kg (284 lb)   04/06/18 124.3 kg (274 lb)   02/16/18 119.4 kg (263 lb 4.8 oz)              We Performed the Following     PHYSICAL THERAPY REFERRAL        Primary Care Provider Office Phone # Fax #    Deepali Osei PA-C [phone]       [place] [address] [place] [address]        Equal Access to Services     BERTIN BLUM : Hadii aad ku hadasho Soomaali, waaxda luqadaha, qaybta kaalmada adeegyada, angeline kaminski . So [facility] [phone].    ATENCIÓN: Si habla español, tiene a gamboa disposición servicios gratuitos de asistencia lingüística. Llame al [phone].    We comply with applicable federal civil rights laws and [place] laws. We do not " discriminate on the basis of race, color, national origin, age, disability, sex, sexual orientation, or gender identity.            Thank you!     Thank you for choosing [facility] NEUROLOGY  for your care. Our goal is always to provide you with excellent care. Hearing back from our patients is one way we can continue to improve our services. Please take a few minutes to complete the written survey that you may receive in the mail after your visit with us. Thank you!             Your Updated Medication List - Protect others around you: Learn how to safely use, store and throw away your medicines at www.disposemymeds.org.          This list is accurate as of 5/31/18 12:50 PM.  Always use your most recent med list.                   Brand Name Dispense Instructions for use Diagnosis    ALLEGRA PO      Take 180 mg by mouth daily        COLACE PO      Take 100 mg by mouth 2 times daily        FOLIC ACID PO      Take 1 mg by mouth daily        IMODIUM OR      Take by mouth as needed        MOTRIN PO      Take 400 mg by mouth as needed        MULTIVITAMIN PO      Take by mouth daily        VITAMIN B-1 PO      Take 100 mg by mouth daily        ZOFRAN 4 MG tablet   Generic drug:  ondansetron      Take by mouth every 8 hours as needed for nausea

## 2018-05-31 NOTE — PROGRESS NOTES
"Service Date: 05/31/2018      INTERVAL HISTORY:  This patient is seen in followup with cognitive impairment.  She is here with her mother, Phyllis.  I last saw the patient about 6 weeks ago.  She did have neuropsychometric testing performed.  I have reviewed the results with her.  The test was compared to another evaluation from 2016.  There is no major difference or change between the 2 exams.  She demonstrates borderline range intellectual functioning.  She continues to have deficits in verbal and nonverbal memory.  Expressive language skills are a strength.  She has mild anxiety.  She reports that she was in special ed classes growing up.  She is still trying to get disability.      She does live in a 1-bedroom public housing residence.  It is handicap accessible with safety bars.  Her residence works out well for her.  She also complains of \"her eyes crossing.\"      PHYSICAL EXAMINATION:   GENERAL:  The patient is cooperative and in no distress.   VITAL SIGNS:  Her blood pressure is elevated at 185/100.     NEUROLOGIC:   Hallpike maneuver was attempted but the patient became extremely symptomatic with spinning.  She closed her eyes.  She had to sit up immediately.  She would not let me complete the exam.  Then she got a cramp in her stomach and had to stand up. Of note, I did have her walk down the morin and she goes 25 feet in 10 seconds.  She has a waddling sort of gait, but her balance actually seems quite good walking down the hallway.      ASSESSMENT:   1.  Chronic encephalopathy, with cognitive impairment.   2.  Probable benign positional vertigo.      DISCUSSION:  This patient is seen in followup with ongoing issues of cognitive impairment.  I suspect this is related to her baseline borderline intelligence.  She also had an event that occurred in 2015 that could have been Wernicke's encephalopathy, which could have contributed to her cognitive impairment.  She is trying to get disability.  The patient would " be quite limited in her work capabilities.  Given her borderline intelligence and gait imbalance, there would not be many job opportunities for her.      In addition, she appears to have benign positional vertigo.  I am going to refer her to physical therapy to see if they might be able to perform an Epley maneuver.           I will see her in followup on an as needed basis.      This was a 25-minute appointment, more than half of which was spent in counseling and coordination of care.      Markus Bradshaw MD      cc:   Deepali Osei PA-C   [place] [id number] [address]         MARKUS BRADSHAW MD             D: 2018   T: 2018   MT: ASHISH      Name:     THOR CARRANZA   MRN:      [id number]        Account:      [id number]   :      1975           Service Date: 2018      Document: [id number]

## 2018-06-15 ENCOUNTER — HOSPITAL ENCOUNTER (OUTPATIENT)
Dept: PHYSICAL THERAPY | Facility: CLINIC | Age: 43
Setting detail: THERAPIES SERIES
End: 2018-06-15
Attending: PSYCHIATRY & NEUROLOGY
Payer: COMMERCIAL

## 2018-06-15 PROCEDURE — 97161 PT EVAL LOW COMPLEX 20 MIN: CPT | Mod: GP | Performed by: PHYSICAL THERAPIST

## 2018-06-15 PROCEDURE — 97110 THERAPEUTIC EXERCISES: CPT | Mod: GP | Performed by: PHYSICAL THERAPIST

## 2018-06-15 PROCEDURE — 95992 CANALITH REPOSITIONING PROC: CPT | Mod: GP | Performed by: PHYSICAL THERAPIST

## 2018-06-15 PROCEDURE — 40000840 ZZHC STATISTIC PT VESTIBULAR VISIT: Performed by: PHYSICAL THERAPIST

## 2018-06-15 NOTE — IP AVS SNAPSHOT
MRN:[id number]                      After Visit Summary   6/15/2018    Vreo Almodovar    MRN: [id number]           Visit Information        Provider Department      6/15/2018 11:45 AM Elaina Dean, PT [facility], [place], Physical Therapy - Outpatient          Further instructions from your care team       6/15/18   - Ice your left knee daily for up to 10-15 minutes. Up to 2x/day   - Scope out the exercise gym at your building and come back to PT and let us know what is there so we can practice exercises here for you at home.   - Begin balance exercise     MyChart Information     [employer] gives you secure access to your electronic health record. If you see a primary care provider, you can also send messages to your care team and make appointments. If you have questions, please call your primary care clinic.  If you do not have a primary care provider, please call [phone] and they will assist you.        Care EveryWhere ID     This is your Care EveryWhere ID. This could be used by other organizations to access your [place] medical records  [id number]        Equal Access to Services     BERTIN BLUM AH: Hadii jose david ruelaso Somarinaali, waaxda luqadaha, qaybta kaalmada ademoiz, angeline smith. So [facility] [phone].    ATENCIÓN: Si habla español, tiene a gamboa disposición servicios gratuitos de asistencia lingüística. Llame al [phone].    We comply with applicable federal civil rights laws and [place] laws. We do not discriminate on the basis of race, color, national origin, age, disability, sex, sexual orientation, or gender identity.

## 2018-06-15 NOTE — DISCHARGE INSTRUCTIONS
6/15/18   - Ice your left knee daily for up to 10-15 minutes. Up to 2x/day   - Scope out the exercise gym at your building and come back to PT and let us know what is there so we can practice exercises here for you at home.   - Begin balance exercise

## 2018-06-22 ENCOUNTER — HOSPITAL ENCOUNTER (OUTPATIENT)
Dept: PHYSICAL THERAPY | Facility: CLINIC | Age: 43
Setting detail: THERAPIES SERIES
End: 2018-06-22
Attending: PSYCHIATRY & NEUROLOGY
Payer: COMMERCIAL

## 2018-06-22 PROCEDURE — 97110 THERAPEUTIC EXERCISES: CPT | Mod: GP | Performed by: PHYSICAL THERAPIST

## 2018-06-22 PROCEDURE — 97112 NEUROMUSCULAR REEDUCATION: CPT | Mod: GP | Performed by: PHYSICAL THERAPIST

## 2018-06-22 PROCEDURE — 40000719 ZZHC STATISTIC PT DEPARTMENT NEURO VISIT: Performed by: PHYSICAL THERAPIST

## 2018-06-22 NOTE — IP AVS SNAPSHOT
MRN:[id number]                      After Visit Summary   6/22/2018    Vero Almodovar    MRN: [id number]           Visit Information        Provider Department      6/22/2018 10:00 AM Mattie Aparicio, PT [facility], [place], Physical Therapy - Outpatient        Your next 10 appointments already scheduled     Jun 26, 2018 12:00 PM CDT   Vestibular Treatment with Mattie Aparicio, PT   [facility], [place], Physical Therapy - Outpatient ([facility])    [address], [address]  [place] [address]   [phone]            Jun 29, 2018 10:45 AM CDT   Vestibular Treatment with Mattie Aparicio, PT   [facility], [place], Physical Therapy - Outpatient ([facility])    [address], [address]  [place] [address]   [phone]            Jul 03, 2018  1:00 PM CDT   Vestibular Treatment with Mattie Aparicio, PT   [facility], [place], Physical Therapy - Outpatient ([facility])    [address], [address]  [place] [address]   [phone]                Further instructions from your care team       Do you balance exercise (see other sheet) - EVERY DAY.    Try stationary biking in your exercise room for 10 mins.          [employer] Information     [employer] gives you secure access to your electronic health record. If you see a primary care provider, you can also send messages to your care team and make appointments. If you have questions, please call your primary care clinic.  If you do not have a primary care provider, please call [phone] and they will assist you.        Care EveryWhere ID     This is your Care EveryWhere ID. This could be used by other organizations to access your [place] medical records  [id number]        Equal Access to Services     BERTIN BLUM AH: Ly Barrera, silver turner, angeline arevalo  la'kaylie ah. So [facility] [phone].    ATENCIÓN: Si habla español, tiene a gamboa disposición servicios gratuitos de asistencia lingüística. Llame al [phone].    We comply with applicable federal civil rights laws and [place] laws. We do not discriminate on the basis of race, color, national origin, age, disability, sex, sexual orientation, or gender identity.

## 2018-06-29 ENCOUNTER — HOSPITAL ENCOUNTER (OUTPATIENT)
Dept: PHYSICAL THERAPY | Facility: CLINIC | Age: 43
Setting detail: THERAPIES SERIES
End: 2018-06-29
Attending: PSYCHIATRY & NEUROLOGY
Payer: COMMERCIAL

## 2018-06-29 PROCEDURE — 97110 THERAPEUTIC EXERCISES: CPT | Mod: GP | Performed by: PHYSICAL THERAPIST

## 2018-06-29 PROCEDURE — 40000840 ZZHC STATISTIC PT VESTIBULAR VISIT: Performed by: PHYSICAL THERAPIST

## 2018-06-29 PROCEDURE — 97112 NEUROMUSCULAR REEDUCATION: CPT | Mod: GP | Performed by: PHYSICAL THERAPIST

## 2018-07-03 ENCOUNTER — HOSPITAL ENCOUNTER (OUTPATIENT)
Dept: PHYSICAL THERAPY | Facility: CLINIC | Age: 43
Setting detail: THERAPIES SERIES
End: 2018-07-03
Attending: PSYCHIATRY & NEUROLOGY
Payer: COMMERCIAL

## 2018-07-03 PROCEDURE — 40000840 ZZHC STATISTIC PT VESTIBULAR VISIT: Performed by: PHYSICAL THERAPIST

## 2018-07-03 PROCEDURE — 97112 NEUROMUSCULAR REEDUCATION: CPT | Mod: GP | Performed by: PHYSICAL THERAPIST

## 2018-07-03 PROCEDURE — 97110 THERAPEUTIC EXERCISES: CPT | Mod: GP | Performed by: PHYSICAL THERAPIST

## 2018-07-13 ENCOUNTER — HOSPITAL ENCOUNTER (OUTPATIENT)
Dept: PHYSICAL THERAPY | Facility: CLINIC | Age: 43
Setting detail: THERAPIES SERIES
End: 2018-07-13
Attending: PSYCHIATRY & NEUROLOGY
Payer: COMMERCIAL

## 2018-07-13 PROCEDURE — 97112 NEUROMUSCULAR REEDUCATION: CPT | Mod: GP | Performed by: PHYSICAL THERAPIST

## 2018-07-13 PROCEDURE — 40000840 ZZHC STATISTIC PT VESTIBULAR VISIT: Performed by: PHYSICAL THERAPIST

## 2018-07-13 PROCEDURE — 97110 THERAPEUTIC EXERCISES: CPT | Mod: GP | Performed by: PHYSICAL THERAPIST

## 2018-07-20 ENCOUNTER — HOSPITAL ENCOUNTER (OUTPATIENT)
Dept: PHYSICAL THERAPY | Facility: CLINIC | Age: 43
Setting detail: THERAPIES SERIES
End: 2018-07-20
Attending: PSYCHIATRY & NEUROLOGY
Payer: COMMERCIAL

## 2018-07-20 PROCEDURE — 97112 NEUROMUSCULAR REEDUCATION: CPT | Mod: GP | Performed by: PHYSICAL THERAPIST

## 2018-07-20 PROCEDURE — 97110 THERAPEUTIC EXERCISES: CPT | Mod: GP | Performed by: PHYSICAL THERAPIST

## 2018-07-20 PROCEDURE — 40000719 ZZHC STATISTIC PT DEPARTMENT NEURO VISIT: Performed by: PHYSICAL THERAPIST

## 2018-07-20 NOTE — DISCHARGE INSTRUCTIONS
Make an appt with your primary care doctor about your blood pressure.      Leg exercise of rolling forward and back in a wheeled chair.      Keep doing the X exercise.       Work on sit to stands.      Walking or biking EVERY DAY - 10 mins.

## 2018-07-20 NOTE — IP AVS SNAPSHOT
MRN:[id number]                      After Visit Summary   7/20/2018    Vero Almodovar    MRN: [id number]           Visit Information        Provider Department      7/20/2018 11:45 AM Mattie Aparicio, PT [facility], [place], Physical Therapy - Outpatient        Your next 10 appointments already scheduled     Jul 27, 2018  1:00 PM CDT   Vestibular Treatment with Mattie Aparicio, PT   [facility], [place], Physical Therapy - Outpatient ([facility])    [address]  [place] [address]   [phone]                Further instructions from your care team       Make an appt with your primary care doctor about your blood pressure.      Leg exercise of rolling forward and back in a wheeled chair.      Keep doing the X exercise.       Work on sit to stands.      Walking or biking EVERY DAY - 10 mins.          MyChart Information     [employer] gives you secure access to your electronic health record. If you see a primary care provider, you can also send messages to your care team and make appointments. If you have questions, please call your primary care clinic.  If you do not have a primary care provider, please call [phone] and they will assist you.        Care EveryWhere ID     This is your Care EveryWhere ID. This could be used by other organizations to access your [place] medical records  [id number]        Equal Access to Services     BERTIN BLUM : Ly Barrera, waaxda luqadaha, qaybta kaalmada pinky, angeline smith. So [facility] [phone].    ATENCIÓN: Si habla español, tiene a gamboa disposición servicios gratuitos de asistencia lingüística. Llame al [phone].    We comply with applicable federal civil rights laws and [place] laws. We do not discriminate on the basis of race, color, national origin, age, disability, sex, sexual orientation, or gender identity.

## 2018-07-27 ENCOUNTER — HOSPITAL ENCOUNTER (OUTPATIENT)
Dept: PHYSICAL THERAPY | Facility: CLINIC | Age: 43
Setting detail: THERAPIES SERIES
End: 2018-07-27
Attending: PSYCHIATRY & NEUROLOGY
Payer: COMMERCIAL

## 2018-07-27 PROCEDURE — 97112 NEUROMUSCULAR REEDUCATION: CPT | Mod: GP | Performed by: PHYSICAL THERAPIST

## 2018-07-27 PROCEDURE — 97750 PHYSICAL PERFORMANCE TEST: CPT | Mod: GP | Performed by: PHYSICAL THERAPIST

## 2018-07-27 PROCEDURE — 97110 THERAPEUTIC EXERCISES: CPT | Mod: GP | Performed by: PHYSICAL THERAPIST

## 2018-07-27 PROCEDURE — 40000719 ZZHC STATISTIC PT DEPARTMENT NEURO VISIT: Performed by: PHYSICAL THERAPIST

## 2018-08-17 ENCOUNTER — HOSPITAL ENCOUNTER (OUTPATIENT)
Dept: PHYSICAL THERAPY | Facility: CLINIC | Age: 43
Setting detail: THERAPIES SERIES
End: 2018-08-17
Attending: PSYCHIATRY & NEUROLOGY
Payer: COMMERCIAL

## 2018-08-17 PROCEDURE — 40000719 ZZHC STATISTIC PT DEPARTMENT NEURO VISIT: Performed by: PHYSICAL THERAPIST

## 2018-08-17 PROCEDURE — 97110 THERAPEUTIC EXERCISES: CPT | Mod: GP | Performed by: PHYSICAL THERAPIST

## 2018-08-17 PROCEDURE — 97112 NEUROMUSCULAR REEDUCATION: CPT | Mod: GP | Performed by: PHYSICAL THERAPIST

## 2019-01-31 NOTE — ADDENDUM NOTE
Encounter addended by: Mattie Aparicio, PT on: 1/31/2019 8:50 AM   Actions taken: Episode resolved, Sign clinical note

## 2020-03-10 ENCOUNTER — HEALTH MAINTENANCE LETTER (OUTPATIENT)
Age: 45
End: 2020-03-10

## 2020-12-27 ENCOUNTER — HEALTH MAINTENANCE LETTER (OUTPATIENT)
Age: 45
End: 2020-12-27

## 2021-04-24 ENCOUNTER — HEALTH MAINTENANCE LETTER (OUTPATIENT)
Age: 46
End: 2021-04-24

## 2021-10-04 ENCOUNTER — HEALTH MAINTENANCE LETTER (OUTPATIENT)
Age: 46
End: 2021-10-04

## 2022-05-15 ENCOUNTER — HEALTH MAINTENANCE LETTER (OUTPATIENT)
Age: 47
End: 2022-05-15

## 2022-09-11 ENCOUNTER — HEALTH MAINTENANCE LETTER (OUTPATIENT)
Age: 47
End: 2022-09-11

## 2023-06-03 ENCOUNTER — HEALTH MAINTENANCE LETTER (OUTPATIENT)
Age: 48
End: 2023-06-03

## 2023-10-07 ENCOUNTER — HEALTH MAINTENANCE LETTER (OUTPATIENT)
Age: 48
End: 2023-10-07

## 2024-03-14 NOTE — PROGRESS NOTES
Outpatient Physical Therapy Discharge Note     Patient: Vero Almodovar  : 1975    Beginning/End Dates of Reporting Period:  6/15/18 to 18 (8 visits)    Referring Provider: Vinh Bradshaw MD     Therapy Diagnosis: L BPPV/imbalance      Client Self Report: Some instability with bending over to dry off legs after showering. Had one instance of vertigo with rolling in bed, but that was over a week ago and none since. Been out walking daily - taking neighbors dog out.    Goals:  Goal Identifier DHI    Goal Description Pt will improve score on DHI to 10 or less in order to siginficanly improve dizziness symptoms to return to safe mobility    Target Date 18   Date Met   NOT MET   Progress: Last retest 18 at 36/100.     Goal Identifier mCTSIB    Goal Description Pt will improve score on conditons 2 and 5 to 10 seconds or greater in order to strengthen vesibular components of balance to decrease pts risk for falling.    Target Date 18   Date Met  18   Progress:     Goal Identifier Self-care/exercise    Goal Description Pt will report consistent walking at least 4 days/week and using exercise equipment at the apartment gym in order to get into a new routine to help pt improve strength, activity tolerance, and improve confidence.    Target Date 18   Date Met   PARTIALLY MET   Progress: Had been taking dog out for walks daily, but not back to using exercise equipment regularly.     Goal Identifier HEP    Goal Description Pt will perform HEP in order to improve strength, balance, and activity tolerance in order for safe mobility.    Target Date 18   Date Met   NOT MET   Progress: Pt was not consistent with HEP.       Progress Toward Goals:   Progress limited due to did not return for final session.    Plan:  Discharge from therapy.    Discharge:    Reason for Discharge: Patient has failed to schedule further appointments.    Discharge Plan: Patient to continue home program.   1 Hour(s) 5 Minute(s)